# Patient Record
Sex: MALE | Race: WHITE
[De-identification: names, ages, dates, MRNs, and addresses within clinical notes are randomized per-mention and may not be internally consistent; named-entity substitution may affect disease eponyms.]

---

## 2017-09-08 ENCOUNTER — HOSPITAL ENCOUNTER (OUTPATIENT)
Dept: HOSPITAL 47 - RADMRIMAIN | Age: 37
End: 2017-09-08
Payer: MEDICAID

## 2017-09-08 DIAGNOSIS — M51.27: ICD-10-CM

## 2017-09-08 DIAGNOSIS — M48.06: Primary | ICD-10-CM

## 2017-09-08 PROCEDURE — 72148 MRI LUMBAR SPINE W/O DYE: CPT

## 2017-09-08 NOTE — MR
EXAMINATION TYPE: MR lumbar spine wo con

 

DATE OF EXAM: 9/8/2017

 

COMPARISON: 9/17/2012

 

HISTORY: Low Back Pain x15 years

 

TECHNIQUE: 

Multiplanar, multisequence images of the lumbar spine were acquired.

 

The lumbar vertebra have normal alignment. There is slight narrowing of the L3-4 disc space. There ar
e small posterior disc bulges at L3-4, L4-5 and L5-S1. There is no spinal stenosis. There is no lumba
r paraspinal mass. There is slight narrowing of the neural foramina bilaterally at L4-5 and L5-S1 due
 to mild disc space narrowing and facet arthropathy. The upper sacroiliac joints are intact. I see no
 significant spinal stenosis. There is no compression fracture.

IMPRESSION:

There are posterior disc bulges at L4-5 and L5-S1 with small herniations that are new compared to old
 exam. Stable small posterior disc bulge at L3-4. No spinal stenosis. No fracture. Stable mild L3-4 d
isc space narrowing.

## 2017-11-28 ENCOUNTER — HOSPITAL ENCOUNTER (OUTPATIENT)
Dept: HOSPITAL 47 - PNWHC3 | Age: 37
Discharge: HOME | End: 2017-11-28
Attending: ANESTHESIOLOGY
Payer: MEDICAID

## 2017-11-28 VITALS — DIASTOLIC BLOOD PRESSURE: 77 MMHG | RESPIRATION RATE: 16 BRPM | SYSTOLIC BLOOD PRESSURE: 142 MMHG

## 2017-11-28 DIAGNOSIS — F17.200: ICD-10-CM

## 2017-11-28 DIAGNOSIS — M47.816: ICD-10-CM

## 2017-11-28 DIAGNOSIS — Z79.891: ICD-10-CM

## 2017-11-28 DIAGNOSIS — Z79.899: ICD-10-CM

## 2017-11-28 DIAGNOSIS — M51.26: Primary | ICD-10-CM

## 2017-11-28 PROCEDURE — 99211 OFF/OP EST MAY X REQ PHY/QHP: CPT

## 2017-11-28 NOTE — P.HPIM
History of Present Illness


H&P Date: 11/28/17


Chief Complaint: low back pain


This is a 37-year-old patient referred by Dr. Godoy for chronic pain in low 

back with radiation to legs.  Patient has been taking medications from primary 

care physician including Norco medications with some relief.  Patient denies 

adverse drug effects from medications.  Patient also denies new-onset weakness, 

bowel/bladder incontinence, or any other signs or symptoms of cauda equina 

syndrome. There are no signs of acute intoxication, and no indications of 

medication diversion or overuse.





Patient notes that pain worsens significantly with standing and walking, and 

improves with sitting and medication.





Patient has used several types of medications for pain, including NSAIDS, 

OPIOIDS.





Patient HAS NOT had surgery.





Patient HAS NOT had injections previously.





Patient HAS NOT had physical therapy recently.





In addition to above, 13-point review of systems is also negative for chest pain

, shortness of breath, changes in vision, changes in hearing, new onset weakness

, abdominal pain, diarrhea, extreme fatigue, malaise, fever, skin changes, 

homicidal or suicidal ideation, or bowel or bladder incontinence.





Vital Signs:  Reviewed in EMR


Gen:  WDWN, AAOx3, NAD


HEENT:  NCAT, EOMI, hearing grossly normal


Pulm:  resp unlabored


Abd:  soft, NT, ND





Neck:  supple, trachea midline





ROM in flexion lumbar spine:  reduced


ROM in extension lumbar spine: reduced


Lumbar paravertebral tenderness: +    


Facet loading:   + bilateral, L > R


SI joint tenderness:  + L > R


Jarrett's test:  + bilateral, L > R


Straight leg raise:  neg





Neuro:  CN II-XII grossly intact, muscle strength lower extremities PRESERVED





Past Medical History


Past Medical History: No Reported History


History of Any Multi-Drug Resistant Organisms: None Reported


Past Surgical History: No Surgical Hx Reported


Past Psychological History: No Psychological Hx Reported


Smoking Status: Former smoker


Past Alcohol Use History: None Reported


Past Drug Use History: None Reported





Medications and Allergies


 Home Medications











 Medication  Instructions  Recorded  Confirmed  Type


 


Citalopram Hydrobromide [CeleXA] 20 mg PO DAILY 08/06/14 11/28/17 History


 


HYDROcodone/APAP 10-325MG [Norco 1 each PO Q6H PRN 08/06/14 11/28/17 History





10]    











 Allergies











Allergy/AdvReac Type Severity Reaction Status Date / Time


 


No Known Allergies Allergy   Verified 11/28/17 13:52














Results


Comments: 





MRI lumbar spine demonstrates slight narrowing of the neural foramina 

bilaterally at L4-L5 and L5-S1 due to mild disc space narrowing facet 

arthropathy.  There are posterior disc bulges at L4-L5 and L5-S1 with small 

herniations new compared to old exam with a small stable posterior disc bulge 

at the L3-L4 level there is no spinal stenosis.





Assessment and Plan


(1) Lumbar disc herniation


Current Visit: Yes   Status: Chronic   Code(s): M51.26 - OTHER INTERVERTEBRAL 

DISC DISPLACEMENT, LUMBAR REGION   SNOMED Code(s): 602713511


   





(2) Lumbar spondylosis


Current Visit: Yes   Status: Chronic   Code(s): M47.816 - SPONDYLOSIS W/O 

MYELOPATHY OR RADICULOPATHY, LUMBAR REGION   SNOMED Code(s): 578439517


   





(3) Chronic pain syndrome


Current Visit: Yes   Status: Chronic   Code(s): G89.4 - CHRONIC PAIN SYNDROME   

SNOMED Code(s): 423787069


   


Plan: 





1. Explanation:  Opioid and psychological risk scores were reviewed.  Diagnoses

, prognoses, and multiple treatment options including but not limited to 

physical therapy, interventional therapies, adjuvant medical therapies, 

narcotic medication therapies, and surgery were discussed with the patient and 

all questions were answered to the patient's satisfaction.





2.  Opioid agreement: no opioids prescribed today





3. Counseling:  The patient was counseled extensively on BODY MASS INDEX, 

EXERCISE.  Specifically, the patient was instructed regarding the importance of 

weight control, and exercise in the context of both chronic pain and overall 

health.





4. Procedures:  bilateral lumbar MBB





5. Consultations:  none





6. Investigations:  none





7. Medications:  none prescribed





8. Disposition:  f/u for procedure as scheduled





PQRS measures:





1-Patient's medications are documented in the chart.


2-Tobacco use is negative


3-Patient has not had a pneumococcal vaccine.


4-Advanced care planning discussed, patient unable to give.


5-Opioid contract signed with the patient.


6-Pain positive, follow-up visit or procedure scheduled


7-Patient's blood pressure measured and documented, and WNL.


8-Patient's weight was measured, and body mass index ABOVE the normal limits, 

and counseling was done.  Patient instructed to follow up with PCP.


9-Patient WAS NOT identified as an unhealthy alcohol user.


Time with Patient: Greater than 30

## 2018-01-09 ENCOUNTER — HOSPITAL ENCOUNTER (OUTPATIENT)
Dept: HOSPITAL 47 - ORPAIN | Age: 38
Discharge: HOME | End: 2018-01-09
Attending: ANESTHESIOLOGY
Payer: MEDICAID

## 2018-01-09 VITALS — BODY MASS INDEX: 29.8 KG/M2

## 2018-01-09 VITALS — HEART RATE: 61 BPM | SYSTOLIC BLOOD PRESSURE: 127 MMHG | DIASTOLIC BLOOD PRESSURE: 82 MMHG

## 2018-01-09 VITALS — TEMPERATURE: 98.1 F

## 2018-01-09 VITALS — RESPIRATION RATE: 18 BRPM

## 2018-01-09 DIAGNOSIS — M47.816: ICD-10-CM

## 2018-01-09 DIAGNOSIS — F32.9: ICD-10-CM

## 2018-01-09 DIAGNOSIS — Z79.891: ICD-10-CM

## 2018-01-09 DIAGNOSIS — Z79.899: ICD-10-CM

## 2018-01-09 DIAGNOSIS — G89.29: Primary | ICD-10-CM

## 2018-01-09 PROCEDURE — 64495 INJ PARAVERT F JNT L/S 3 LEV: CPT

## 2018-01-09 PROCEDURE — 64493 INJ PARAVERT F JNT L/S 1 LEV: CPT

## 2018-01-09 PROCEDURE — 64494 INJ PARAVERT F JNT L/S 2 LEV: CPT

## 2018-01-09 PROCEDURE — 99152 MOD SED SAME PHYS/QHP 5/>YRS: CPT

## 2018-01-09 NOTE — FL
Fluoroscopy

 

INDICATION: Pain

 

FINDINGS:

 

Fluoroscopy time: 23 seconds.

 

Images obtained: 6.

 

IMPRESSIONS:

1. Documentation of fluoroscopy.

## 2018-01-09 NOTE — P.PCN
Date of Procedure: 01/09/18


Surgeon: David Kiser


Pathology: none sent


Condition: stable


Disposition: PACU


Description of Procedure: 


PREOPERATIVE DIAGNOSIS: Lumbar spondylosis without myelopathy and facet 

arthropathy.





POSTOPERATIVE DIAGNOSIS: Lumbar spondylosis without myelopathy and facet 

arthropathy.





PROCEDURE DESCRIPTION: Patient presents for L3-L4, L4-L5 and L5-S1 diagnostic 

medial branch blocks under fluoroscopic guidance. The procedure is performed 

using fluoroscopic guidance during needle placement to assure proper position 

and maximize safety.





ANESTHESIA: Local with 1% lidocaine; conscious sedation





EBL: Minimal





PROCEDURE INDICATION: Patient with lumbar facet arthropathy signs and symptoms, 

here for diagnostic medial branch block #2 after 70% relief for two days from 

first MBB.  Pt does not take any blood thinning medications.





PROCEDURE DESCRIPTION: 





The patient was seen and identified in the preoperative area. Risks, benefits, 

complications, and alternatives were discussed with the patient (including but 

not limited to incomplete pain relief, bleeding, infection, nerve damage, and 

allergies to medications), the patient agreed to proceed with the procedure and 

signed the consent after all questions were answered. Patient was taken to the 

OR and time out was completed to verify proper patient, position, laterality of 

pain, and allergies. Pt was placed in the prone position and a pillow was 

placed under the abdomen to reduce lumbar lordosis. The lumbosacral area was 

prepped and draped in the usual sterile fashion. 





Using oblique fluoroscopy, the eye of the "Malik dog" of right L4 vertebral 

body, which corresponds to the path of the medial branch originating from the 

level above, which is L3 in this case, was identified. Subsequently, a 22-gauge 

3.5-inch spinal needle was inserted under fluoroscopic guidance toward the eye 

of the "Malik dog" of the right L4 vertebral body, corresponding to the 

junction of the superior articular process and the transverse process of the 

pedicle of the same level. After needle tip confirmation on lateral view and 

after negative aspiration for CSF and blood and without paresthesias, 1 mL of a 

6 ml solution of 0.5% preservative-free bupivacaine and 40 mg Kenalog was 

injected. 





Subsequently the needle was withdrawn intact and the same procedure was 

repeated for the right L4, right L5, left L3, left L4, and left L5 medial 

branches which together with right L3 medial branch correspond to the sensory 

innervation of the bilateral L3-L4, L4-L5, and L5-S1 facet joints.





Needle was withdrawn intact after each injection. At the end of the procedure, 

the skin was cleansed and bandages were applied. 





COMPLICATIONS: None.





DISPOSITION/PLAN: 





The patient taken to the recovery area after the procedure in a stable 

condition for observation. Patient was reexamined prior to discharge and there 

were no issues. Patient was discharged home, accompanied by an adult, after 

meeting discharged criteria. Discharge instructions were give to the patient by 

the staff. Patient was specifically instructed not to drive today and to rest 

for the rest of the day. Patient to follow up in clinic in 3-4 weeks.

## 2023-10-18 ENCOUNTER — HOSPITAL ENCOUNTER (EMERGENCY)
Dept: HOSPITAL 47 - EC | Age: 43
Discharge: OTHER INSTITUTION | End: 2023-10-18
Payer: COMMERCIAL
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
## 2023-10-18 DIAGNOSIS — S01.01XA: Primary | ICD-10-CM
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
## 2023-10-18 LAB
ALBUMIN SERPL-MCNC: 4 G/DL (ref 3.5–5)
ALP SERPL-CCNC: 91 U/L (ref 38–126)
ALT SERPL-CCNC: 38 U/L (ref 4–49)
ANION GAP SERPL CALC-SCNC: 11 MMOL/L
APTT BLD: 23.8 SEC (ref 22–30)
AST SERPL-CCNC: 81 U/L (ref 17–59)
BASOPHILS # BLD AUTO: 0 K/UL (ref 0–0.2)
BASOPHILS NFR BLD AUTO: 0 %
BUN SERPL-SCNC: 8 MG/DL (ref 9–20)
CALCIUM SPEC-MCNC: 8.5 MG/DL (ref 8.4–10.2)
CHLORIDE SERPL-SCNC: 101 MMOL/L (ref 98–107)
CO2 BLDA-SCNC: 24 MMOL/L (ref 19–24)
CO2 SERPL-SCNC: 25 MMOL/L (ref 22–30)
EOSINOPHIL # BLD AUTO: 0.3 K/UL (ref 0–0.7)
EOSINOPHIL NFR BLD AUTO: 3 %
ERYTHROCYTE [DISTWIDTH] IN BLOOD BY AUTOMATED COUNT: 4.75 M/UL (ref 4.3–5.9)
ERYTHROCYTE [DISTWIDTH] IN BLOOD: 12.4 % (ref 11.5–15.5)
GLUCOSE SERPL-MCNC: 121 MG/DL (ref 74–99)
HCO3 BLDA-SCNC: 23 MMOL/L (ref 21–25)
HCT VFR BLD AUTO: 44.1 % (ref 39–53)
HGB BLD-MCNC: 14.4 GM/DL (ref 13–17.5)
HYALINE CASTS UR QL AUTO: 1 /LPF (ref 0–2)
INR PPP: 0.9 (ref ?–1.2)
LYMPHOCYTES # SPEC AUTO: 3.1 K/UL (ref 1–4.8)
LYMPHOCYTES NFR SPEC AUTO: 25 %
MCH RBC QN AUTO: 30.3 PG (ref 25–35)
MCHC RBC AUTO-ENTMCNC: 32.7 G/DL (ref 31–37)
MCV RBC AUTO: 92.8 FL (ref 80–100)
MONOCYTES # BLD AUTO: 0.4 K/UL (ref 0–1)
MONOCYTES NFR BLD AUTO: 3 %
NEUTROPHILS # BLD AUTO: 8.3 K/UL (ref 1.3–7.7)
NEUTROPHILS NFR BLD AUTO: 68 %
PCO2 BLDA: 38 MMHG (ref 35–45)
PH BLDA: 7.39 [PH] (ref 7.35–7.45)
PH UR: 6 [PH] (ref 5–8)
PLATELET # BLD AUTO: 416 K/UL (ref 150–450)
PO2 BLDA: 285 MMHG (ref 83–108)
POTASSIUM SERPL-SCNC: 4.2 MMOL/L (ref 3.5–5.1)
PROT SERPL-MCNC: 7.6 G/DL (ref 6.3–8.2)
PT BLD: 10.4 SEC (ref 10–12.5)
RBC UR QL: 1 /HPF (ref 0–5)
SODIUM SERPL-SCNC: 137 MMOL/L (ref 137–145)
SP GR UR: 1.01 (ref 1–1.03)
UROBILINOGEN UR QL STRIP: <2 MG/DL (ref ?–2)
WBC # BLD AUTO: 12.3 K/UL (ref 3.8–10.6)
WBC # UR AUTO: 1 /HPF (ref 0–5)

## 2023-10-18 PROCEDURE — 36600 WITHDRAWAL OF ARTERIAL BLOOD: CPT

## 2023-10-18 PROCEDURE — 86901 BLOOD TYPING SEROLOGIC RH(D): CPT

## 2023-10-18 PROCEDURE — 81001 URINALYSIS AUTO W/SCOPE: CPT

## 2023-10-18 PROCEDURE — 82805 BLOOD GASES W/O2 SATURATION: CPT

## 2023-10-18 PROCEDURE — 71045 X-RAY EXAM CHEST 1 VIEW: CPT

## 2023-10-18 PROCEDURE — 99291 CRITICAL CARE FIRST HOUR: CPT

## 2023-10-18 PROCEDURE — 86850 RBC ANTIBODY SCREEN: CPT

## 2023-10-18 PROCEDURE — 94002 VENT MGMT INPAT INIT DAY: CPT

## 2023-10-18 PROCEDURE — 85025 COMPLETE CBC W/AUTO DIFF WBC: CPT

## 2023-10-18 PROCEDURE — 80053 COMPREHEN METABOLIC PANEL: CPT

## 2023-10-18 PROCEDURE — 84484 ASSAY OF TROPONIN QUANT: CPT

## 2023-10-18 PROCEDURE — 86900 BLOOD TYPING SEROLOGIC ABO: CPT

## 2023-10-18 PROCEDURE — 36415 COLL VENOUS BLD VENIPUNCTURE: CPT

## 2023-10-18 PROCEDURE — 72170 X-RAY EXAM OF PELVIS: CPT

## 2023-10-18 PROCEDURE — 85730 THROMBOPLASTIN TIME PARTIAL: CPT

## 2023-10-18 PROCEDURE — 85610 PROTHROMBIN TIME: CPT

## 2023-10-18 PROCEDURE — 83605 ASSAY OF LACTIC ACID: CPT
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
## 2023-10-18 NOTE — XR
EXAMINATION TYPE: XR pelvis AP view

 

DATE OF EXAM: 10/18/2023

 

CLINICAL HISTORY: pain

 

TECHNIQUE: Single view the pelvis is submitted.

 

FINDINGS: No  evidence for fracture, dislocation or bony lesion.  Joint spaces are well-preserved.  S
I joints appear symmetric.

 

IMPRESSION: 

 

1. No acute fracture or dislocation seen.

 

ICD 10 NO FRACTURE, INITIAL EVALUATION

## 2023-10-20 LAB — GLUCOSE BLD-MCNC: 110 MG/DL (ref 70–110)
